# Patient Record
Sex: MALE | Race: WHITE | HISPANIC OR LATINO
[De-identification: names, ages, dates, MRNs, and addresses within clinical notes are randomized per-mention and may not be internally consistent; named-entity substitution may affect disease eponyms.]

---
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
## 2021-04-28 ENCOUNTER — APPOINTMENT (OUTPATIENT)
Dept: PLASTIC SURGERY | Facility: CLINIC | Age: 21
End: 2021-04-28
Payer: COMMERCIAL

## 2021-04-28 VITALS
SYSTOLIC BLOOD PRESSURE: 146 MMHG | DIASTOLIC BLOOD PRESSURE: 85 MMHG | WEIGHT: 235 LBS | BODY MASS INDEX: 41.64 KG/M2 | HEIGHT: 63 IN | HEART RATE: 73 BPM | TEMPERATURE: 97 F

## 2021-04-28 PROCEDURE — 99072 ADDL SUPL MATRL&STAF TM PHE: CPT

## 2021-04-28 PROCEDURE — 99204 OFFICE O/P NEW MOD 45 MIN: CPT

## 2021-04-28 NOTE — REVIEW OF SYSTEMS
[Eyesight Problems] : eyesight problems [Dry Eyes] : dryness of the eyes [Anxiety] : anxiety [Depression] : depression [Negative] : Heme/Lymph

## 2021-04-30 NOTE — REASON FOR VISIT
[Consultation] : a consultation visit [FreeTextEntry1] : Patient Present to the office for Top surgery.

## 2021-04-30 NOTE — PHYSICAL EXAM
[de-identified] : NAD.  BMI 41.6. [de-identified] : Normal respiratory effort [de-identified] : No dominant masses, skin changes, nipple retraction, or palpable axillary lymphadenopathy. SN->N distance is 31 cm on the right and 31.5 cm on the left; width is 19 cm on the right and 20 cm on the left; N->IMF is 7.5 cm on the right and 8 cm on the left. There is grade 2 ptosis.

## 2021-04-30 NOTE — ASSESSMENT
[FreeTextEntry1] : The patient is a good candidate for DIFNG top surgery with concurrent lateral chest wall liposuction.  He expresses understanding that the liposuction component of the operation may not be covered by insurance.  He has 2 letters of support from Karin Pompa LMSW and Crissy Clarke MD.

## 2021-04-30 NOTE — HISTORY OF PRESENT ILLNESS
[FreeTextEntry1] : 21 y/o man DFaB ([place]; he/him) presents for consultation for top surgery. He is interested in having a "masculine" appearing chest. He has been on testosterone for 1 year and 4 months and binding for 2 years.  He denies any lumps, bumps, or other changes in the tissue.  He has not had any breast imaging.  He endorses a family history of breast cancer in his mother and a few aunts.  He states he is BRCA negative.  He states that nipple sensation is not very important to him (2 out of 5 in importance) and he has no plans to ever breast or chest feed.\par \par He is specifically interested in concurrent chest wall liposuction during the procedure.\par \par He is currently unemployed but usually works in retail.  He lives with his fiancée, Kathi, who is here with him today.  He states his fiancée and father would be able to help him after surgery.  He states he quit nicotine products 2 months ago.  He does smoke marijuana but agrees to switch to edibles.  He denies alcohol use.

## 2021-07-01 ENCOUNTER — OUTPATIENT (OUTPATIENT)
Dept: OUTPATIENT SERVICES | Facility: HOSPITAL | Age: 21
LOS: 1 days | End: 2021-07-01
Payer: COMMERCIAL

## 2021-07-01 VITALS
OXYGEN SATURATION: 98 % | WEIGHT: 253.09 LBS | HEIGHT: 64 IN | TEMPERATURE: 97 F | RESPIRATION RATE: 18 BRPM | HEART RATE: 81 BPM | SYSTOLIC BLOOD PRESSURE: 137 MMHG | DIASTOLIC BLOOD PRESSURE: 95 MMHG

## 2021-07-01 DIAGNOSIS — Z98.890 OTHER SPECIFIED POSTPROCEDURAL STATES: Chronic | ICD-10-CM

## 2021-07-01 DIAGNOSIS — F64.0 TRANSSEXUALISM: ICD-10-CM

## 2021-07-01 DIAGNOSIS — Z01.818 ENCOUNTER FOR OTHER PREPROCEDURAL EXAMINATION: ICD-10-CM

## 2021-07-01 LAB
ANION GAP SERPL CALC-SCNC: 12 MMOL/L — SIGNIFICANT CHANGE UP (ref 5–17)
BLD GP AB SCN SERPL QL: NEGATIVE — SIGNIFICANT CHANGE UP
BUN SERPL-MCNC: 13 MG/DL — SIGNIFICANT CHANGE UP (ref 7–23)
CALCIUM SERPL-MCNC: 9.3 MG/DL — SIGNIFICANT CHANGE UP (ref 8.4–10.5)
CHLORIDE SERPL-SCNC: 103 MMOL/L — SIGNIFICANT CHANGE UP (ref 96–108)
CO2 SERPL-SCNC: 21 MMOL/L — LOW (ref 22–31)
CREAT SERPL-MCNC: 0.84 MG/DL — SIGNIFICANT CHANGE UP (ref 0.5–1.3)
GLUCOSE SERPL-MCNC: 89 MG/DL — SIGNIFICANT CHANGE UP (ref 70–99)
HCT VFR BLD CALC: 50.7 % — HIGH (ref 39–50)
HGB BLD-MCNC: 16.3 G/DL — SIGNIFICANT CHANGE UP (ref 13–17)
MCHC RBC-ENTMCNC: 24.8 PG — LOW (ref 27–34)
MCHC RBC-ENTMCNC: 32.1 GM/DL — SIGNIFICANT CHANGE UP (ref 32–36)
MCV RBC AUTO: 77.2 FL — LOW (ref 80–100)
NRBC # BLD: 0 /100 WBCS — SIGNIFICANT CHANGE UP (ref 0–0)
PLATELET # BLD AUTO: 253 K/UL — SIGNIFICANT CHANGE UP (ref 150–400)
POTASSIUM SERPL-MCNC: 4.4 MMOL/L — SIGNIFICANT CHANGE UP (ref 3.5–5.3)
POTASSIUM SERPL-SCNC: 4.4 MMOL/L — SIGNIFICANT CHANGE UP (ref 3.5–5.3)
RBC # BLD: 6.57 M/UL — HIGH (ref 4.2–5.8)
RBC # FLD: 13.5 % — SIGNIFICANT CHANGE UP (ref 10.3–14.5)
RH IG SCN BLD-IMP: NEGATIVE — SIGNIFICANT CHANGE UP
SODIUM SERPL-SCNC: 136 MMOL/L — SIGNIFICANT CHANGE UP (ref 135–145)
WBC # BLD: 5.97 K/UL — SIGNIFICANT CHANGE UP (ref 3.8–10.5)
WBC # FLD AUTO: 5.97 K/UL — SIGNIFICANT CHANGE UP (ref 3.8–10.5)

## 2021-07-01 PROCEDURE — 86900 BLOOD TYPING SEROLOGIC ABO: CPT

## 2021-07-01 PROCEDURE — 86901 BLOOD TYPING SEROLOGIC RH(D): CPT

## 2021-07-01 PROCEDURE — 85027 COMPLETE CBC AUTOMATED: CPT

## 2021-07-01 PROCEDURE — 86850 RBC ANTIBODY SCREEN: CPT

## 2021-07-01 PROCEDURE — G0463: CPT

## 2021-07-01 PROCEDURE — 80048 BASIC METABOLIC PNL TOTAL CA: CPT

## 2021-07-01 RX ORDER — LIDOCAINE HCL 20 MG/ML
0.2 VIAL (ML) INJECTION ONCE
Refills: 0 | Status: DISCONTINUED | OUTPATIENT
Start: 2021-07-15 | End: 2021-07-29

## 2021-07-01 RX ORDER — SODIUM CHLORIDE 9 MG/ML
3 INJECTION INTRAMUSCULAR; INTRAVENOUS; SUBCUTANEOUS EVERY 8 HOURS
Refills: 0 | Status: DISCONTINUED | OUTPATIENT
Start: 2021-07-15 | End: 2021-07-29

## 2021-07-01 NOTE — H&P PST ADULT - NSICDXPROBLEM_GEN_ALL_CORE_FT
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

## 2021-07-01 NOTE — H&P PST ADULT - NSICDXPASTSURGICALHX_GEN_ALL_CORE_FT
PAST SURGICAL HISTORY:  History of hip surgery Hip dysplasia  RIght 2017, WIth  3 out of 4 hardware removal in 2018    S/P removal of ovarian cyst 2017

## 2021-07-01 NOTE — H&P PST ADULT - HISTORY OF PRESENT ILLNESS
This is a 20 year old female transitioning to male with past medical history of Asthma (last attack Jan 2021 while with active Covid infection,  mild symptom denies any hospitalization or oxygenations) Otherwise uses inhaler as needed.  Scheduled for bilateral subtotal subcutaneous mastectomy, free nipple grafting, bilateral chest wall liposuction on 7/15/21 with Dr. Quintana    **Covid vaccination on chart  Denies any recent illness, sick contact or travel outside [place].    This is a 20 year old born female transitioning to male with past medical history of Asthma (last attack Jan 2021 while with active Covid infection,  mild symptom denies any hospitalization or oxygenations) Otherwise uses inhaler as needed.  Scheduled for bilateral subtotal subcutaneous mastectomy, free nipple grafting, bilateral chest wall liposuction on 7/15/21 with Dr. Quintana    **Covid vaccination on chart  Denies any recent illness, sick contact or travel outside [place].

## 2021-07-01 NOTE — H&P PST ADULT - NSANTHOSAYNRD_GEN_A_CORE
No. NAVDEEP screening performed.  STOP BANG Legend: 0-2 = LOW Risk; 3-4 = INTERMEDIATE Risk; 5-8 = HIGH Risk

## 2021-07-06 ENCOUNTER — TRANSCRIPTION ENCOUNTER (OUTPATIENT)
Age: 21
End: 2021-07-06

## 2021-07-09 PROBLEM — J45.909 UNSPECIFIED ASTHMA, UNCOMPLICATED: Chronic | Status: ACTIVE | Noted: 2021-07-01

## 2021-07-14 ENCOUNTER — TRANSCRIPTION ENCOUNTER (OUTPATIENT)
Age: 21
End: 2021-07-14

## 2021-07-15 ENCOUNTER — RESULT REVIEW (OUTPATIENT)
Age: 21
End: 2021-07-15

## 2021-07-15 ENCOUNTER — APPOINTMENT (OUTPATIENT)
Dept: PLASTIC SURGERY | Facility: HOSPITAL | Age: 21
End: 2021-07-15

## 2021-07-15 ENCOUNTER — OUTPATIENT (OUTPATIENT)
Dept: OUTPATIENT SERVICES | Facility: HOSPITAL | Age: 21
LOS: 1 days | End: 2021-07-15
Payer: COMMERCIAL

## 2021-07-15 VITALS
HEART RATE: 97 BPM | OXYGEN SATURATION: 97 % | SYSTOLIC BLOOD PRESSURE: 136 MMHG | TEMPERATURE: 99 F | WEIGHT: 253.09 LBS | RESPIRATION RATE: 15 BRPM | DIASTOLIC BLOOD PRESSURE: 83 MMHG | HEIGHT: 64 IN

## 2021-07-15 VITALS
DIASTOLIC BLOOD PRESSURE: 75 MMHG | HEART RATE: 94 BPM | SYSTOLIC BLOOD PRESSURE: 124 MMHG | RESPIRATION RATE: 16 BRPM | TEMPERATURE: 97 F | OXYGEN SATURATION: 97 %

## 2021-07-15 DIAGNOSIS — Z01.818 ENCOUNTER FOR OTHER PREPROCEDURAL EXAMINATION: ICD-10-CM

## 2021-07-15 DIAGNOSIS — Z98.890 OTHER SPECIFIED POSTPROCEDURAL STATES: Chronic | ICD-10-CM

## 2021-07-15 DIAGNOSIS — F64.0 TRANSSEXUALISM: ICD-10-CM

## 2021-07-15 PROCEDURE — 19318 BREAST REDUCTION: CPT

## 2021-07-15 PROCEDURE — 88305 TISSUE EXAM BY PATHOLOGIST: CPT

## 2021-07-15 PROCEDURE — 19303 MAST SIMPLE COMPLETE: CPT | Mod: 50

## 2021-07-15 PROCEDURE — 86850 RBC ANTIBODY SCREEN: CPT

## 2021-07-15 PROCEDURE — 19350 NIPPLE/AREOLA RECONSTRUCTION: CPT | Mod: 50

## 2021-07-15 PROCEDURE — 86901 BLOOD TYPING SEROLOGIC RH(D): CPT

## 2021-07-15 PROCEDURE — C9399: CPT

## 2021-07-15 PROCEDURE — 86900 BLOOD TYPING SEROLOGIC ABO: CPT

## 2021-07-15 PROCEDURE — 88305 TISSUE EXAM BY PATHOLOGIST: CPT | Mod: 26

## 2021-07-15 PROCEDURE — 15877 SUCTION LIPECTOMY TRUNK: CPT

## 2021-07-15 RX ORDER — APREPITANT 80 MG/1
40 CAPSULE ORAL ONCE
Refills: 0 | Status: COMPLETED | OUTPATIENT
Start: 2021-07-15 | End: 2021-07-15

## 2021-07-15 RX ORDER — OXYCODONE HYDROCHLORIDE 5 MG/1
1 TABLET ORAL
Qty: 10 | Refills: 0
Start: 2021-07-15

## 2021-07-15 RX ORDER — ALBUTEROL 90 UG/1
2 AEROSOL, METERED ORAL
Qty: 0 | Refills: 0 | DISCHARGE

## 2021-07-15 RX ORDER — CEFAZOLIN SODIUM 1 G
2000 VIAL (EA) INJECTION ONCE
Refills: 0 | Status: COMPLETED | OUTPATIENT
Start: 2021-07-15 | End: 2021-07-15

## 2021-07-15 RX ORDER — CHLORHEXIDINE GLUCONATE 213 G/1000ML
1 SOLUTION TOPICAL ONCE
Refills: 0 | Status: COMPLETED | OUTPATIENT
Start: 2021-07-15 | End: 2021-07-15

## 2021-07-15 RX ADMIN — CHLORHEXIDINE GLUCONATE 1 APPLICATION(S): 213 SOLUTION TOPICAL at 11:22

## 2021-07-15 RX ADMIN — APREPITANT 40 MILLIGRAM(S): 80 CAPSULE ORAL at 11:21

## 2021-07-15 NOTE — ASU DISCHARGE PLAN (ADULT/PEDIATRIC) - ASU DC SPECIAL INSTRUCTIONSFT
NOTIFY YOUR SURGEON IF YOU HAVE: any bleeding that does not stop, any pus draining from your wound(s), any fever (over 100.4 F) persistent nausea/vomiting, or if your pain is not controlled on your discharge pain medications, unable to urinate.    ACTIVITY: Please refrain from increased physical activity for a period of 2 weeks. Please do not lift anything heavier than a gallon of milk or about 5 pounds. Upon follow up you will be given further instructions on how much physical activity you may do.     DRESSING: please leave all dressings on, in place, and dry until follow up.     Please take 1000mg Tylenol every 6 hours as needed. Please do not exceed 4000mg Tylenol in any 24 hour period. You are being prescribed 5mg oxycodone tablets. Please take 1 every 6 hours as needed for severe post surgical breakthrough pain not controlled by other medications. This prescription has been sent to your specified pharmacy.     no antibiotics requried.     DRAINS: You will be discharged with ROBERT drains. You will need to empty them and record outputs accurately. This will be taught to you by the nursing staff. Please do not remove the ROBERT drains. They will be removed in the office. Please bring to the office accurate records of output.     Please follow up with Dr. Quintana within 1 week of discharge from the hospital. You may call [phone] to schedule an appointment. Please call to schedule a follow up appointment for Wednesday 7/21/21

## 2021-07-15 NOTE — ASU DISCHARGE PLAN (ADULT/PEDIATRIC) - CARE PROVIDER_API CALL
Catalino Quintana)  Surgery  [facility]  [address], [address]  [place] [address]  Phone: [phone]  Fax: [phone]  Scheduled Appointment: 07/21/2021

## 2021-07-15 NOTE — BRIEF OPERATIVE NOTE - NSICDXBRIEFPROCEDURE_GEN_ALL_CORE_FT
PROCEDURES:  Bilateral subtotal mastectomy 15-Jul-2021 18:22:11  Benjamín Crisostomo  Free nipple graft of both breasts 15-Jul-2021 18:22:25  Benjamín Crisostomo

## 2021-07-15 NOTE — ASU PATIENT PROFILE, ADULT - PSH
History of hip surgery  Hip dysplasia  RIght 2017, WIth  3 out of 4 hardware removal in 2018  S/P removal of ovarian cyst  2017

## 2021-07-20 LAB — SURGICAL PATHOLOGY STUDY: SIGNIFICANT CHANGE UP

## 2021-07-21 ENCOUNTER — APPOINTMENT (OUTPATIENT)
Dept: PLASTIC SURGERY | Facility: CLINIC | Age: 21
End: 2021-07-21
Payer: COMMERCIAL

## 2021-07-21 PROCEDURE — 99024 POSTOP FOLLOW-UP VISIT: CPT

## 2021-07-21 RX ORDER — CEPHALEXIN 500 MG/1
500 CAPSULE ORAL 3 TIMES DAILY
Qty: 15 | Refills: 0 | Status: ACTIVE | COMMUNITY
Start: 2021-07-21 | End: 1900-01-01

## 2021-07-24 NOTE — REASON FOR VISIT
[Post Op: _________] : a [unfilled] post op visit [Spouse] : spouse [FreeTextEntry1] : DOP - 7/15/21 S/P Top Surgery.

## 2021-07-24 NOTE — PHYSICAL EXAM
[de-identified] : Extensive nontender erythema and and blistering around Dermabond placement areas.  No significant erythema or blistering around NACs.  Bolsters and drains removed completely.  NACs appear adherent.  No areas of fullness or fluctuance.

## 2021-07-24 NOTE — ASSESSMENT
[FreeTextEntry1] : Local skin reaction at Dermabond site.  Will treat empirically for potential impetigo, although this seems much less likely.  Remove Dermabond strips if area does not improve.  Wound care otherwise discussed.  Continue compression.  Follow-up in 1 week for reassessment.

## 2021-08-04 ENCOUNTER — APPOINTMENT (OUTPATIENT)
Dept: PLASTIC SURGERY | Facility: CLINIC | Age: 21
End: 2021-08-04
Payer: COMMERCIAL

## 2021-08-04 DIAGNOSIS — Z87.2 PERSONAL HISTORY OF DISEASES OF THE SKIN AND SUBCUTANEOUS TISSUE: ICD-10-CM

## 2021-08-04 PROCEDURE — 99024 POSTOP FOLLOW-UP VISIT: CPT

## 2021-08-04 NOTE — PHYSICAL EXAM
[de-identified] : Good symmetry.  Reasonable lateral chest fullness.  Improvement of rash.  Areolar sutures removed.  No areas of fullness or fluctuance.

## 2021-08-04 NOTE — HISTORY OF PRESENT ILLNESS
[FreeTextEntry1] : The patient's rash has improved.  It is still hyperpigmented but no longer itchy.  He is happy with his result especially with regard to the lateral chest.  He denies any areas of fullness or fluctuance.

## 2021-08-04 NOTE — ASSESSMENT
[FreeTextEntry1] : No evidence of infection or collection.  Improvement of rash.  Continue compression for 3-4 more weeks.  Return office in 3 to 4 weeks for reassessment.

## 2021-08-25 ENCOUNTER — APPOINTMENT (OUTPATIENT)
Dept: PLASTIC SURGERY | Facility: CLINIC | Age: 21
End: 2021-08-25
Payer: COMMERCIAL

## 2021-08-25 PROCEDURE — 99024 POSTOP FOLLOW-UP VISIT: CPT

## 2021-08-27 NOTE — PHYSICAL EXAM
[de-identified] : Good symmetry.  No significant areas of fullness or fluctuance.  Hyperpigmentation persistent from prior rash.  Left lateral incision with small areas of granulation, addressed with silver nitrate.  Minimal erythema in this area.

## 2021-08-27 NOTE — HISTORY OF PRESENT ILLNESS
[FreeTextEntry1] : Wilberto returns for his approximately 6-week appointment following top surgery.  He has had decreased drainage from the left incision and has had multiple sutures removed from this area.  He still has some small amount of drainage.  He denies significant discomfort.  The rash on his chest is resolved.

## 2021-08-27 NOTE — ASSESSMENT
[FreeTextEntry1] : Healing well.  Mild granulation on left.  Continue local wound care.  If area fails to resolve, then return within 2 weeks or so.  Otherwise may return in 6 weeks for reassessment.

## 2021-09-07 ENCOUNTER — APPOINTMENT (OUTPATIENT)
Dept: FAMILY MEDICINE | Facility: CLINIC | Age: 21
End: 2021-09-07
Payer: COMMERCIAL

## 2021-09-07 VITALS — OXYGEN SATURATION: 98 % | TEMPERATURE: 98.4 F | HEART RATE: 106 BPM | RESPIRATION RATE: 14 BRPM

## 2021-09-07 DIAGNOSIS — B34.9 VIRAL INFECTION, UNSPECIFIED: ICD-10-CM

## 2021-09-07 PROCEDURE — 36415 COLL VENOUS BLD VENIPUNCTURE: CPT

## 2021-09-07 PROCEDURE — 99214 OFFICE O/P EST MOD 30 MIN: CPT | Mod: 25

## 2021-09-07 RX ORDER — FAMCICLOVIR 500 MG/1
500 TABLET, FILM COATED ORAL 3 TIMES DAILY
Qty: 21 | Refills: 5 | Status: ACTIVE | COMMUNITY
Start: 2021-09-07 | End: 1900-01-01

## 2021-09-07 NOTE — HISTORY OF PRESENT ILLNESS
[FreeTextEntry1] : Pt is here for lip swollen concerns. [de-identified] : swelling right lower lip pt has lip ring

## 2021-09-08 DIAGNOSIS — L03.90 CELLULITIS, UNSPECIFIED: ICD-10-CM

## 2021-09-08 LAB
BASOPHILS # BLD AUTO: 0.06 K/UL
BASOPHILS NFR BLD AUTO: 0.5 %
EOSINOPHIL # BLD AUTO: 0.33 K/UL
EOSINOPHIL NFR BLD AUTO: 2.5 %
HCT VFR BLD CALC: 47.6 %
HGB BLD-MCNC: 15.5 G/DL
HSV 1+2 IGG SER IA-IMP: NEGATIVE
HSV 1+2 IGG SER IA-IMP: NEGATIVE
HSV1 IGG SER QL: 0.03 INDEX
HSV2 IGG SER QL: 0.14 INDEX
IMM GRANULOCYTES NFR BLD AUTO: 0.5 %
LYMPHOCYTES # BLD AUTO: 2.47 K/UL
LYMPHOCYTES NFR BLD AUTO: 18.8 %
MAN DIFF?: NORMAL
MCHC RBC-ENTMCNC: 25.9 PG
MCHC RBC-ENTMCNC: 32.6 GM/DL
MCV RBC AUTO: 79.6 FL
MONOCYTES # BLD AUTO: 0.87 K/UL
MONOCYTES NFR BLD AUTO: 6.6 %
NEUTROPHILS # BLD AUTO: 9.34 K/UL
NEUTROPHILS NFR BLD AUTO: 71.1 %
PLATELET # BLD AUTO: 242 K/UL
RBC # BLD: 5.98 M/UL
RBC # FLD: 13 %
WBC # FLD AUTO: 13.14 K/UL

## 2021-09-08 RX ORDER — CEPHALEXIN 500 MG/1
500 TABLET ORAL EVERY 6 HOURS
Qty: 40 | Refills: 0 | Status: ACTIVE | COMMUNITY
Start: 2021-09-08 | End: 1900-01-01

## 2021-09-15 LAB
HSV1 IGM SER QL: NEGATIVE
HSV2 AB FLD-ACNC: NEGATIVE

## 2021-10-06 ENCOUNTER — APPOINTMENT (OUTPATIENT)
Dept: PLASTIC SURGERY | Facility: CLINIC | Age: 21
End: 2021-10-06
Payer: COMMERCIAL

## 2021-10-06 DIAGNOSIS — F64.0 TRANSSEXUALISM: ICD-10-CM

## 2021-10-06 PROCEDURE — 99024 POSTOP FOLLOW-UP VISIT: CPT

## 2021-10-09 PROBLEM — F64.0 GENDER DYSPHORIA IN ADOLESCENT AND ADULT: Status: ACTIVE | Noted: 2021-04-29

## 2021-10-09 NOTE — HISTORY OF PRESENT ILLNESS
[FreeTextEntry1] : Patient returns 3 months following top surgery. He states he is happy with his result.

## 2021-10-09 NOTE — PHYSICAL EXAM
[de-identified] : Good symmetry. Mild appearance of bilateral lateral chest rolls. No areas of fullness or fluctuance.
